# Patient Record
Sex: FEMALE | Race: WHITE | NOT HISPANIC OR LATINO | URBAN - METROPOLITAN AREA
[De-identification: names, ages, dates, MRNs, and addresses within clinical notes are randomized per-mention and may not be internally consistent; named-entity substitution may affect disease eponyms.]

---

## 2021-02-09 ENCOUNTER — EMERGENCY (EMERGENCY)
Facility: HOSPITAL | Age: 71
LOS: 1 days | Discharge: HOME | End: 2021-02-09
Attending: EMERGENCY MEDICINE
Payer: MEDICARE

## 2021-02-09 VITALS
SYSTOLIC BLOOD PRESSURE: 94 MMHG | TEMPERATURE: 98 F | OXYGEN SATURATION: 98 % | DIASTOLIC BLOOD PRESSURE: 67 MMHG | RESPIRATION RATE: 16 BRPM | HEART RATE: 83 BPM

## 2021-02-09 LAB
ALBUMIN SERPL ELPH-MCNC: 2.8 G/DL — LOW (ref 3.5–5)
ALP SERPL-CCNC: 95 U/L — SIGNIFICANT CHANGE UP (ref 40–120)
ALT FLD-CCNC: 18 U/L DA — SIGNIFICANT CHANGE UP (ref 10–60)
ANION GAP SERPL CALC-SCNC: 5 MMOL/L — SIGNIFICANT CHANGE UP (ref 5–17)
APPEARANCE UR: CLEAR — SIGNIFICANT CHANGE UP
AST SERPL-CCNC: 12 U/L — SIGNIFICANT CHANGE UP (ref 10–40)
BASOPHILS # BLD AUTO: 0.04 K/UL — SIGNIFICANT CHANGE UP (ref 0–0.2)
BASOPHILS NFR BLD AUTO: 0.9 % — SIGNIFICANT CHANGE UP (ref 0–2)
BILIRUB SERPL-MCNC: 0.3 MG/DL — SIGNIFICANT CHANGE UP (ref 0.2–1.2)
BILIRUB UR-MCNC: NEGATIVE — SIGNIFICANT CHANGE UP
BUN SERPL-MCNC: 19 MG/DL — HIGH (ref 7–18)
CALCIUM SERPL-MCNC: 8.7 MG/DL — SIGNIFICANT CHANGE UP (ref 8.4–10.5)
CHLORIDE SERPL-SCNC: 109 MMOL/L — HIGH (ref 96–108)
CO2 SERPL-SCNC: 28 MMOL/L — SIGNIFICANT CHANGE UP (ref 22–31)
COLOR SPEC: YELLOW — SIGNIFICANT CHANGE UP
CREAT SERPL-MCNC: 0.88 MG/DL — SIGNIFICANT CHANGE UP (ref 0.5–1.3)
DIFF PNL FLD: NEGATIVE — SIGNIFICANT CHANGE UP
EOSINOPHIL # BLD AUTO: 0.25 K/UL — SIGNIFICANT CHANGE UP (ref 0–0.5)
EOSINOPHIL NFR BLD AUTO: 5.4 % — SIGNIFICANT CHANGE UP (ref 0–6)
GLUCOSE SERPL-MCNC: 83 MG/DL — SIGNIFICANT CHANGE UP (ref 70–99)
GLUCOSE UR QL: NEGATIVE — SIGNIFICANT CHANGE UP
HCT VFR BLD CALC: 36.9 % — SIGNIFICANT CHANGE UP (ref 34.5–45)
HGB BLD-MCNC: 11.7 G/DL — SIGNIFICANT CHANGE UP (ref 11.5–15.5)
IMM GRANULOCYTES NFR BLD AUTO: 0.2 % — SIGNIFICANT CHANGE UP (ref 0–1.5)
KETONES UR-MCNC: NEGATIVE — SIGNIFICANT CHANGE UP
LEUKOCYTE ESTERASE UR-ACNC: NEGATIVE — SIGNIFICANT CHANGE UP
LYMPHOCYTES # BLD AUTO: 1.85 K/UL — SIGNIFICANT CHANGE UP (ref 1–3.3)
LYMPHOCYTES # BLD AUTO: 40.3 % — SIGNIFICANT CHANGE UP (ref 13–44)
MCHC RBC-ENTMCNC: 29.4 PG — SIGNIFICANT CHANGE UP (ref 27–34)
MCHC RBC-ENTMCNC: 31.7 GM/DL — LOW (ref 32–36)
MCV RBC AUTO: 92.7 FL — SIGNIFICANT CHANGE UP (ref 80–100)
MONOCYTES # BLD AUTO: 0.69 K/UL — SIGNIFICANT CHANGE UP (ref 0–0.9)
MONOCYTES NFR BLD AUTO: 15 % — HIGH (ref 2–14)
NEUTROPHILS # BLD AUTO: 1.75 K/UL — LOW (ref 1.8–7.4)
NEUTROPHILS NFR BLD AUTO: 38.2 % — LOW (ref 43–77)
NITRITE UR-MCNC: NEGATIVE — SIGNIFICANT CHANGE UP
NRBC # BLD: 0 /100 WBCS — SIGNIFICANT CHANGE UP (ref 0–0)
PH UR: 6 — SIGNIFICANT CHANGE UP (ref 5–8)
PLATELET # BLD AUTO: 210 K/UL — SIGNIFICANT CHANGE UP (ref 150–400)
POTASSIUM SERPL-MCNC: 4.2 MMOL/L — SIGNIFICANT CHANGE UP (ref 3.5–5.3)
POTASSIUM SERPL-SCNC: 4.2 MMOL/L — SIGNIFICANT CHANGE UP (ref 3.5–5.3)
PROT SERPL-MCNC: 6.3 G/DL — SIGNIFICANT CHANGE UP (ref 6–8.3)
PROT UR-MCNC: NEGATIVE — SIGNIFICANT CHANGE UP
RBC # BLD: 3.98 M/UL — SIGNIFICANT CHANGE UP (ref 3.8–5.2)
RBC # FLD: 14.4 % — SIGNIFICANT CHANGE UP (ref 10.3–14.5)
SODIUM SERPL-SCNC: 142 MMOL/L — SIGNIFICANT CHANGE UP (ref 135–145)
SP GR SPEC: 1.02 — SIGNIFICANT CHANGE UP (ref 1.01–1.02)
TROPONIN I SERPL-MCNC: <0.015 NG/ML — SIGNIFICANT CHANGE UP (ref 0–0.04)
TSH SERPL-MCNC: 1.77 UU/ML — SIGNIFICANT CHANGE UP (ref 0.34–4.82)
UROBILINOGEN FLD QL: NEGATIVE — SIGNIFICANT CHANGE UP
WBC # BLD: 4.59 K/UL — SIGNIFICANT CHANGE UP (ref 3.8–10.5)
WBC # FLD AUTO: 4.59 K/UL — SIGNIFICANT CHANGE UP (ref 3.8–10.5)

## 2021-02-09 PROCEDURE — 99285 EMERGENCY DEPT VISIT HI MDM: CPT

## 2021-02-09 PROCEDURE — 71045 X-RAY EXAM CHEST 1 VIEW: CPT | Mod: 26

## 2021-02-09 PROCEDURE — 70450 CT HEAD/BRAIN W/O DYE: CPT | Mod: 26

## 2021-02-09 RX ORDER — METOCLOPRAMIDE HCL 10 MG
10 TABLET ORAL ONCE
Refills: 0 | Status: COMPLETED | OUTPATIENT
Start: 2021-02-09 | End: 2021-02-09

## 2021-02-09 RX ORDER — SODIUM CHLORIDE 9 MG/ML
1000 INJECTION INTRAMUSCULAR; INTRAVENOUS; SUBCUTANEOUS ONCE
Refills: 0 | Status: COMPLETED | OUTPATIENT
Start: 2021-02-09 | End: 2021-02-09

## 2021-02-09 RX ADMIN — Medication 10 MILLIGRAM(S): at 22:24

## 2021-02-09 RX ADMIN — SODIUM CHLORIDE 1000 MILLILITER(S): 9 INJECTION INTRAMUSCULAR; INTRAVENOUS; SUBCUTANEOUS at 22:22

## 2021-02-09 NOTE — ED ADULT NURSE NOTE - NSIMPLEMENTINTERV_GEN_ALL_ED
Implemented All Fall Risk Interventions:  [place] to call system. Call bell, personal items and telephone within reach. Instruct patient to call for assistance. Room bathroom lighting operational. Non-slip footwear when patient is off stretcher. Physically safe environment: no spills, clutter or unnecessary equipment. Stretcher in lowest position, wheels locked, appropriate side rails in place. Provide visual cue, wrist band, yellow gown, etc. Monitor gait and stability. Monitor for mental status changes and reorient to person, place, and time. Review medications for side effects contributing to fall risk. Reinforce activity limits and safety measures with patient and family.

## 2021-02-09 NOTE — ED ADULT NURSE NOTE - OBJECTIVE STATEMENT
Patient was sent from assisted living home for evaluation of period of lethargy and confusion.  Patient is alert, oriented to person and place,

## 2021-02-09 NOTE — ED PROVIDER NOTE - OBJECTIVE STATEMENT
69 y/o female h/o bipolar disorder, hypercholesterolemia, was referred here because she was found in her room not opening her eyes but stating her name when asked what her name is. Pt currently c/o diffuse headache. Pt states she is always tired and sleeping. Denies fever, N/V/D, cough, photophobia, or nuchal rigidity. Medications include: Trazadone, Clonazepam, Gabapentin, Bupropion, and Lamictal.

## 2021-02-09 NOTE — ED PROVIDER NOTE - PATIENT PORTAL LINK FT
You can access the FollowMyHealth Patient Portal offered by [facility] by registering at the following website: [email or web address]. By joining [employer]’s FollowMyHealth portal, you will also be able to view your health information using other applications (apps) compatible with our system.

## 2021-02-09 NOTE — ED ADULT TRIAGE NOTE - CHIEF COMPLAINT QUOTE
Patient was sent from assisted living home for evaluation of period of lethargy and confusion.  Patient is alert, oriented to person and place, disoriented to time

## 2021-02-10 VITALS
HEART RATE: 71 BPM | OXYGEN SATURATION: 99 % | DIASTOLIC BLOOD PRESSURE: 68 MMHG | SYSTOLIC BLOOD PRESSURE: 115 MMHG | RESPIRATION RATE: 17 BRPM

## 2021-02-10 LAB
RAPID RVP RESULT: SIGNIFICANT CHANGE UP
SARS-COV-2 RNA SPEC QL NAA+PROBE: SIGNIFICANT CHANGE UP

## 2021-02-10 PROCEDURE — 36415 COLL VENOUS BLD VENIPUNCTURE: CPT

## 2021-02-10 PROCEDURE — 85025 COMPLETE CBC W/AUTO DIFF WBC: CPT

## 2021-02-10 PROCEDURE — 71045 X-RAY EXAM CHEST 1 VIEW: CPT

## 2021-02-10 PROCEDURE — 93005 ELECTROCARDIOGRAM TRACING: CPT

## 2021-02-10 PROCEDURE — 70450 CT HEAD/BRAIN W/O DYE: CPT

## 2021-02-10 PROCEDURE — 96374 THER/PROPH/DIAG INJ IV PUSH: CPT

## 2021-02-10 PROCEDURE — 99284 EMERGENCY DEPT VISIT MOD MDM: CPT | Mod: 25

## 2021-02-10 PROCEDURE — 84484 ASSAY OF TROPONIN QUANT: CPT

## 2021-02-10 PROCEDURE — 81003 URINALYSIS AUTO W/O SCOPE: CPT

## 2021-02-10 PROCEDURE — 84443 ASSAY THYROID STIM HORMONE: CPT

## 2021-02-10 PROCEDURE — 0225U NFCT DS DNA&RNA 21 SARSCOV2: CPT

## 2021-02-10 PROCEDURE — 80053 COMPREHEN METABOLIC PANEL: CPT

## 2022-01-25 NOTE — ED PROVIDER NOTE - NS ED MD DISPO DISCHARGE
Patient has a history of chronically elevated troponin level in 500s and today patient's troponin level was 596.  There was no ST-T abnormalities accompanying elevated troponin level.  No recent ischemia evaluation on record. Will consult Cardiology in a.m. for recommendations     Home

## 2022-02-05 ENCOUNTER — EMERGENCY (EMERGENCY)
Facility: HOSPITAL | Age: 72
LOS: 1 days | Discharge: HOME | End: 2022-02-05
Attending: EMERGENCY MEDICINE
Payer: MEDICARE

## 2022-02-05 VITALS
WEIGHT: 154.98 LBS | RESPIRATION RATE: 16 BRPM | HEART RATE: 82 BPM | OXYGEN SATURATION: 96 % | DIASTOLIC BLOOD PRESSURE: 74 MMHG | SYSTOLIC BLOOD PRESSURE: 111 MMHG | TEMPERATURE: 98 F | HEIGHT: 62 IN

## 2022-02-05 PROCEDURE — 99285 EMERGENCY DEPT VISIT HI MDM: CPT

## 2022-02-05 PROCEDURE — 70486 CT MAXILLOFACIAL W/O DYE: CPT | Mod: 26,MA

## 2022-02-05 PROCEDURE — 70450 CT HEAD/BRAIN W/O DYE: CPT | Mod: 26,MA

## 2022-02-05 NOTE — ED ADULT TRIAGE NOTE - CHIEF COMPLAINT QUOTE
BIBA from assisted living, pt states she tripped and fell, landed on her face, broke her nose and dentures, denies LOC, dried blood noted in nose and mouth, not actively bleeding, ems states pt was ambulating steadily on scene

## 2022-02-05 NOTE — ED PROVIDER NOTE - PROGRESS NOTE DETAILS
pt's sister Génesis Morocho [phone] claims she have h/o ataxia, tremors in the past, pt was supposed to get neurology w/u but never happened Pt insists to call her sister regarding her broken denture/broken implants.  Message left with pt's sister to come up to [place] from [place] to replace her teeth. Case d/w Dr. Albrecht, will arrange for neuro consult once pt returns to [place]. case d/w pt's sister, Génesis Morocho

## 2022-02-05 NOTE — ED PROVIDER NOTE - OBJECTIVE STATEMENT
71 y.o. [place] female BIBA claims she accidentally tripped on the rug, fell forward & hit her face.  Pt broke her upper denture, lower implant.  Pt also sustained Lt sided epistaxis, no head injury, LOC, other injuries, neck pain.

## 2022-02-05 NOTE — ED PROVIDER NOTE - PATIENT PORTAL LINK FT
You can access the FollowMyHealth Patient Portal offered by [facility] by registering at the following website: [email or web address]. By joining [employer]’s FollowMyHealth portal, you will also be able to view your health information using other applications (apps) compatible with our system.

## 2022-02-05 NOTE — ED PROVIDER NOTE - PHYSICAL EXAMINATION
mid upper lip-inner lip-small lac/hematoma  no teeth upper lip  lower teeth-few implant post noted  nose- no active bleeding, NT to palp., mild deformity Lt nasal septum  neck- NT to palp

## 2022-02-06 VITALS
HEART RATE: 73 BPM | DIASTOLIC BLOOD PRESSURE: 70 MMHG | RESPIRATION RATE: 18 BRPM | OXYGEN SATURATION: 97 % | SYSTOLIC BLOOD PRESSURE: 122 MMHG | TEMPERATURE: 98 F

## 2022-02-06 PROBLEM — F31.9 BIPOLAR DISORDER, UNSPECIFIED: Chronic | Status: ACTIVE | Noted: 2021-02-10

## 2022-02-06 PROBLEM — E78.00 PURE HYPERCHOLESTEROLEMIA, UNSPECIFIED: Chronic | Status: ACTIVE | Noted: 2021-02-10

## 2022-02-06 PROCEDURE — 70450 CT HEAD/BRAIN W/O DYE: CPT | Mod: MA

## 2022-02-06 PROCEDURE — 70486 CT MAXILLOFACIAL W/O DYE: CPT | Mod: MA

## 2022-02-06 PROCEDURE — 99284 EMERGENCY DEPT VISIT MOD MDM: CPT | Mod: 25

## 2022-02-06 RX ORDER — TRAZODONE HCL 50 MG
100 TABLET ORAL ONCE
Refills: 0 | Status: COMPLETED | OUTPATIENT
Start: 2022-02-06 | End: 2022-02-06

## 2022-02-06 RX ORDER — CLONAZEPAM 1 MG
0.5 TABLET ORAL ONCE
Refills: 0 | Status: DISCONTINUED | OUTPATIENT
Start: 2022-02-06 | End: 2022-02-06

## 2022-02-06 RX ADMIN — Medication 100 MILLIGRAM(S): at 01:08

## 2022-02-06 RX ADMIN — Medication 0.5 MILLIGRAM(S): at 01:08

## 2022-02-06 NOTE — ED ADULT NURSE NOTE - OBJECTIVE STATEMENT
BIBA from assisted living s/p fall. Pt tripped and fell, landed on face, broke nose and dentures. No active bleeding noted. Dried blood noted in nose and mouth

## 2024-01-17 NOTE — ED PROVIDER NOTE - PROGRESS NOTE DETAILS
Pt informed through portal message.   Pt s/o pending CT results and plan for d/c. Pt awake alert and ambulatory w/o difficulty. Asking for d/c back to [facility]. [facility] service contacted.

## 2025-07-01 ENCOUNTER — EMERGENCY (EMERGENCY)
Facility: HOSPITAL | Age: 75
LOS: 1 days | End: 2025-07-01
Attending: EMERGENCY MEDICINE | Admitting: EMERGENCY MEDICINE
Payer: MEDICARE

## 2025-07-01 VITALS
WEIGHT: 113.1 LBS | SYSTOLIC BLOOD PRESSURE: 109 MMHG | HEIGHT: 62 IN | TEMPERATURE: 98 F | DIASTOLIC BLOOD PRESSURE: 69 MMHG | HEART RATE: 83 BPM | RESPIRATION RATE: 22 BRPM | OXYGEN SATURATION: 96 %

## 2025-07-01 VITALS
HEART RATE: 87 BPM | SYSTOLIC BLOOD PRESSURE: 126 MMHG | DIASTOLIC BLOOD PRESSURE: 74 MMHG | OXYGEN SATURATION: 99 % | RESPIRATION RATE: 18 BRPM | TEMPERATURE: 98 F

## 2025-07-01 LAB
ALBUMIN SERPL ELPH-MCNC: 3.9 G/DL — SIGNIFICANT CHANGE UP (ref 3.3–5)
ALP SERPL-CCNC: 90 U/L — SIGNIFICANT CHANGE UP (ref 40–120)
ALT FLD-CCNC: 27 U/L — SIGNIFICANT CHANGE UP (ref 4–33)
AMPHET UR-MCNC: NEGATIVE — SIGNIFICANT CHANGE UP
ANION GAP SERPL CALC-SCNC: 15 MMOL/L — HIGH (ref 7–14)
APAP SERPL-MCNC: <10 UG/ML — LOW (ref 15–25)
APPEARANCE UR: CLEAR — SIGNIFICANT CHANGE UP
AST SERPL-CCNC: 31 U/L — SIGNIFICANT CHANGE UP (ref 4–32)
BARBITURATES UR SCN-MCNC: NEGATIVE — SIGNIFICANT CHANGE UP
BASOPHILS # BLD AUTO: 0.03 K/UL — SIGNIFICANT CHANGE UP (ref 0–0.2)
BASOPHILS NFR BLD AUTO: 0.6 % — SIGNIFICANT CHANGE UP (ref 0–2)
BENZODIAZ UR-MCNC: NEGATIVE — SIGNIFICANT CHANGE UP
BILIRUB SERPL-MCNC: 0.5 MG/DL — SIGNIFICANT CHANGE UP (ref 0.2–1.2)
BILIRUB UR-MCNC: NEGATIVE — SIGNIFICANT CHANGE UP
BUN SERPL-MCNC: 14 MG/DL — SIGNIFICANT CHANGE UP (ref 7–23)
CALCIUM SERPL-MCNC: 9.1 MG/DL — SIGNIFICANT CHANGE UP (ref 8.4–10.5)
CHLORIDE SERPL-SCNC: 98 MMOL/L — SIGNIFICANT CHANGE UP (ref 98–107)
CO2 SERPL-SCNC: 22 MMOL/L — SIGNIFICANT CHANGE UP (ref 22–31)
COCAINE METAB.OTHER UR-MCNC: NEGATIVE — SIGNIFICANT CHANGE UP
COLOR SPEC: YELLOW — SIGNIFICANT CHANGE UP
CREAT SERPL-MCNC: 0.79 MG/DL — SIGNIFICANT CHANGE UP (ref 0.5–1.3)
CREATININE URINE RESULT, DAU: 12 MG/DL — SIGNIFICANT CHANGE UP
DIFF PNL FLD: NEGATIVE — SIGNIFICANT CHANGE UP
EGFR: 77 ML/MIN/1.73M2 — SIGNIFICANT CHANGE UP
EGFR: 77 ML/MIN/1.73M2 — SIGNIFICANT CHANGE UP
EOSINOPHIL # BLD AUTO: 0.1 K/UL — SIGNIFICANT CHANGE UP (ref 0–0.5)
EOSINOPHIL NFR BLD AUTO: 1.8 % — SIGNIFICANT CHANGE UP (ref 0–6)
ETHANOL SERPL-MCNC: <10 MG/DL — SIGNIFICANT CHANGE UP
FENTANYL UR QL SCN: NEGATIVE — SIGNIFICANT CHANGE UP
GLUCOSE SERPL-MCNC: 95 MG/DL — SIGNIFICANT CHANGE UP (ref 70–99)
GLUCOSE UR QL: NEGATIVE MG/DL — SIGNIFICANT CHANGE UP
HCT VFR BLD CALC: 35 % — SIGNIFICANT CHANGE UP (ref 34.5–45)
HGB BLD-MCNC: 11.5 G/DL — SIGNIFICANT CHANGE UP (ref 11.5–15.5)
IMM GRANULOCYTES # BLD AUTO: 0.01 K/UL — SIGNIFICANT CHANGE UP (ref 0–0.07)
IMM GRANULOCYTES NFR BLD AUTO: 0.2 % — SIGNIFICANT CHANGE UP (ref 0–0.9)
KETONES UR QL: NEGATIVE MG/DL — SIGNIFICANT CHANGE UP
LEUKOCYTE ESTERASE UR-ACNC: NEGATIVE — SIGNIFICANT CHANGE UP
LYMPHOCYTES # BLD AUTO: 1.42 K/UL — SIGNIFICANT CHANGE UP (ref 1–3.3)
LYMPHOCYTES NFR BLD AUTO: 26.1 % — SIGNIFICANT CHANGE UP (ref 13–44)
MCHC RBC-ENTMCNC: 30.3 PG — SIGNIFICANT CHANGE UP (ref 27–34)
MCHC RBC-ENTMCNC: 32.9 G/DL — SIGNIFICANT CHANGE UP (ref 32–36)
MCV RBC AUTO: 92.1 FL — SIGNIFICANT CHANGE UP (ref 80–100)
METHADONE UR-MCNC: NEGATIVE — SIGNIFICANT CHANGE UP
MONOCYTES # BLD AUTO: 0.52 K/UL — SIGNIFICANT CHANGE UP (ref 0–0.9)
MONOCYTES NFR BLD AUTO: 9.6 % — SIGNIFICANT CHANGE UP (ref 2–14)
NEUTROPHILS # BLD AUTO: 3.36 K/UL — SIGNIFICANT CHANGE UP (ref 1.8–7.4)
NEUTROPHILS NFR BLD AUTO: 61.7 % — SIGNIFICANT CHANGE UP (ref 43–77)
NITRITE UR-MCNC: NEGATIVE — SIGNIFICANT CHANGE UP
NRBC # BLD AUTO: 0 K/UL — SIGNIFICANT CHANGE UP (ref 0–0)
NRBC # FLD: 0 K/UL — SIGNIFICANT CHANGE UP (ref 0–0)
NRBC BLD AUTO-RTO: 0 /100 WBCS — SIGNIFICANT CHANGE UP (ref 0–0)
NT-PROBNP SERPL-SCNC: 201 PG/ML — SIGNIFICANT CHANGE UP
OPIATES UR-MCNC: NEGATIVE — SIGNIFICANT CHANGE UP
OXYCODONE UR-MCNC: NEGATIVE — SIGNIFICANT CHANGE UP
PCP SPEC-MCNC: SIGNIFICANT CHANGE UP
PCP UR-MCNC: NEGATIVE — SIGNIFICANT CHANGE UP
PH UR: 8 — SIGNIFICANT CHANGE UP (ref 5–8)
PLATELET # BLD AUTO: 207 K/UL — SIGNIFICANT CHANGE UP (ref 150–400)
PMV BLD: 9.7 FL — SIGNIFICANT CHANGE UP (ref 7–13)
POTASSIUM SERPL-MCNC: 3.8 MMOL/L — SIGNIFICANT CHANGE UP (ref 3.5–5.3)
POTASSIUM SERPL-SCNC: 3.8 MMOL/L — SIGNIFICANT CHANGE UP (ref 3.5–5.3)
PROT SERPL-MCNC: 6.9 G/DL — SIGNIFICANT CHANGE UP (ref 6–8.3)
PROT UR-MCNC: NEGATIVE MG/DL — SIGNIFICANT CHANGE UP
RBC # BLD: 3.8 M/UL — SIGNIFICANT CHANGE UP (ref 3.8–5.2)
RBC # FLD: 13.7 % — SIGNIFICANT CHANGE UP (ref 10.3–14.5)
SALICYLATES SERPL-MCNC: <0.3 MG/DL — LOW (ref 15–30)
SODIUM SERPL-SCNC: 135 MMOL/L — SIGNIFICANT CHANGE UP (ref 135–145)
SP GR SPEC: 1 — LOW (ref 1–1.03)
THC UR QL: NEGATIVE — SIGNIFICANT CHANGE UP
TOXICOLOGY SCREEN, DRUGS OF ABUSE, SERUM RESULT: SIGNIFICANT CHANGE UP
TROPONIN T, HIGH SENSITIVITY RESULT: 9 NG/L — SIGNIFICANT CHANGE UP
TSH SERPL-MCNC: 2.11 UIU/ML — SIGNIFICANT CHANGE UP (ref 0.27–4.2)
UROBILINOGEN FLD QL: 0.2 MG/DL — SIGNIFICANT CHANGE UP (ref 0.2–1)
WBC # BLD: 5.44 K/UL — SIGNIFICANT CHANGE UP (ref 3.8–10.5)
WBC # FLD AUTO: 5.44 K/UL — SIGNIFICANT CHANGE UP (ref 3.8–10.5)

## 2025-07-01 PROCEDURE — 99284 EMERGENCY DEPT VISIT MOD MDM: CPT

## 2025-07-01 PROCEDURE — 93971 EXTREMITY STUDY: CPT | Mod: 26,LT

## 2025-07-01 PROCEDURE — 71046 X-RAY EXAM CHEST 2 VIEWS: CPT | Mod: 26

## 2025-07-01 PROCEDURE — 93010 ELECTROCARDIOGRAM REPORT: CPT

## 2025-07-01 RX ORDER — BUPROPION HYDROBROMIDE 522 MG/1
150 TABLET, EXTENDED RELEASE ORAL ONCE
Refills: 0 | Status: COMPLETED | OUTPATIENT
Start: 2025-07-01 | End: 2025-07-01

## 2025-07-01 RX ORDER — LORAZEPAM 4 MG/ML
0.5 VIAL (ML) INJECTION ONCE
Refills: 0 | Status: DISCONTINUED | OUTPATIENT
Start: 2025-07-01 | End: 2025-07-01

## 2025-07-01 RX ORDER — BUPROPION HYDROBROMIDE 522 MG/1
150 TABLET, EXTENDED RELEASE ORAL ONCE
Refills: 0 | Status: DISCONTINUED | OUTPATIENT
Start: 2025-07-01 | End: 2025-07-01

## 2025-07-01 RX ADMIN — BUPROPION HYDROBROMIDE 150 MILLIGRAM(S): 522 TABLET, EXTENDED RELEASE ORAL at 18:29

## 2025-07-01 RX ADMIN — Medication 0.5 MILLIGRAM(S): at 18:30

## 2025-07-01 NOTE — ED PROVIDER NOTE - NSFOLLOWUPINSTRUCTIONS_ED_ALL_ED_FT
Follow-up with your primary care doctor within 1 week  Continue taking medications as previously prescribed  Keep legs elevated while resting  Return to the ER with any worsening or concerning symptoms, worsening leg swelling, shortness of breath, chest pain, dizziness, weakness or any other concerns You were seen and evaluated in the emergency department for lightheadedness.  Your lab work and imaging was normal.  The results are included in this packet.  Follow-up with your primary care doctor within 1 week  Continue taking medications as previously prescribed  Keep legs elevated while resting  Return to the ER with any worsening or concerning symptoms, worsening leg swelling, shortness of breath, chest pain, dizziness, weakness or any other concerns

## 2025-07-01 NOTE — ED ADULT NURSE NOTE - OBJECTIVE STATEMENT
pt received to 26, aox4.  pt sent to ED for "delusions"  pt's meds were recently adjusted and as per EMS, pt was haing delusions at the nursing home.  pt states she just feels tired because she was unable to sleep last night.  pt has no other complaints, denies SI/HI/AVH.  SL placed, labs sent, awaiting further rders

## 2025-07-01 NOTE — ED PROVIDER NOTE - PATIENT PORTAL LINK FT
You can access the FollowMyHealth Patient Portal offered by [facility] by registering at the following website: [email or web address]. By joining [employer]’s FollowMyHealth portal, you will also be able to view your health information using other applications (apps) compatible with our system. You can access the FollowMyHealth Patient Portal offered by [facility] by registering at the following website: [email or web address]. By joining [employer]’s FollowMyHealth portal, you will also be able to view your health information using other applications (apps) compatible with our system. You can access the FollowMyHealth Patient Portal offered by [facility] by registering at the following website: [email or web address]. By joining [employer]’s FollowMyHealth portal, you will also be able to view your health information using other applications (apps) compatible with our system.

## 2025-07-01 NOTE — ED PROVIDER NOTE - CLINICAL SUMMARY MEDICAL DECISION MAKING FREE TEXT BOX
74yF w/pmhx bipolar, HLD, COPD sent to the ED from [facility] with concern for agitation, right leg swelling. Pt states this morning she was not feeling well, reports feeling lightheaded and as though she might pass out. States she had breakfast and was instructed to take her medications but did not fell well, was seen by the nurse and sent to the ED for further evaluation. Pt reports that she had recently had a fall, was using a walker but is now using a cane to ambulate. Pt also reports at times she has shortness of breath but attributes this to her lung disease. Pt states her clonazepam dose was recently changed, she hasn't been sleeping well and reports she also drank coffee which she is not supposed to. Pt denies fever/chills, chest pain, palpitations, headache, abd pain, n/v/d or any other concerns. 74yF w/pmhx bipolar, HLD, COPD sent to the ED from [facility] with concern for agitation, right leg swelling. Pt states this morning she was not feeling well, reports feeling lightheaded and as though she might pass out. States she had breakfast and was instructed to take her medications but did not fell well, was seen by the nurse and sent to the ED for further evaluation. Pt reports that she had recently had a fall, was using a walker but is now using a cane to ambulate. Pt also reports at times she has shortness of breath but attributes this to her lung disease. Spoke with RN at [facility] assisted living, on Saturday clonazepam was changed from BID to daily and Trazadone was discontinued. On exam pt is well appearing, afebrile, pt is well appearing, A&Ox3, answering questions appropriately, calm and cooperative, b/l LE edema R>L. Will r/o ACS, new onset CHF, DVT, no tachycardia or hypoxia to suggest PE. Plan: cbc/cmp, trop, bnp, US duplex RLE, CXR, EKG, cardiac monitor. Pt denies SI/HI, auditory or visual hallucinations, not anxious at time of my evaluation, no indication for emergent psych consult in ED. 74yF w/pmhx bipolar, HLD, COPD sent to the ED from [facility] with concern for agitation, right leg swelling. Pt states this morning she was not feeling well, reports feeling lightheaded and as though she might pass out. States she had breakfast and was instructed to take her medications but did not fell well, was seen by the nurse and sent to the ED for further evaluation. Pt reports that she had recently had a fall, was using a walker but is now using a cane to ambulate. Pt also reports at times she has shortness of breath but attributes this to her lung disease, unchanged from her baseline, not worsening. Spoke with RN at [facility] assisted living, on Saturday clonazepam was changed from BID to daily and Trazadone was discontinued. On exam pt is well appearing, afebrile, pt is well appearing, A&Ox3, answering questions appropriately, calm and cooperative, b/l LE edema R>L. Will r/o ACS, new onset CHF, DVT, no tachycardia or hypoxia to suggest PE. Plan: cbc/cmp, trop, bnp, US duplex RLE, CXR, EKG, cardiac monitor. Pt denies SI/HI, auditory or visual hallucinations, not anxious at time of my evaluation, no indication for emergent psych consult in ED.

## 2025-07-01 NOTE — PROVIDER CONTACT NOTE (OTHER) - ASSESSMENT
Pt [facility] [address] [phone]. Trip# [id number]. P/U 1 AM.
Arranged [facility] [phone]. Trip# [id number]. P/U 7 PM. Pt is returning back to [facility] in [facility] at [address]

## 2025-07-01 NOTE — ED PROVIDER NOTE - DATE/TIME 1
01-Jul-2025 12:01
X Size Of Lesion In Cm (Optional): 0
Introduction Text (Please End With A Colon): The following procedure was deferred:
Other Procedure: cosmetic extraction
Detail Level: Simple

## 2025-07-01 NOTE — ED PROVIDER NOTE - NEURO NEGATIVE STATEMENT, MLM
----- Message from Matilde Villar sent at 7/18/2023  1:20 PM CDT -----  Patient came by office to make appt with Dr Wilson    please call patient on this matter and she will see who ever is coming   patient having issues with left foot pain    and toe nail clip                  reach at [phone]     no loss of consciousness, no gait abnormality, no headache, no sensory deficits, and no weakness.

## 2025-07-01 NOTE — ED ADULT TRIAGE NOTE - CHIEF COMPLAINT QUOTE
Patient sent from [facility] for psychiatric evaluation. Per EMS patient's trazodone and klonopin were recently discontinued and per staff she was having delusions this morning. Patient alert and oriented in triage but does not know why she is here, reports anxiety, denies SI/HI/hallucinations. Patient c/o bilateral lower extremity edema, pitting edema noted, R worse than L, denies chest pain, SOB, dizziness at this time. Phx bipolar, HTN, COPD

## 2025-07-01 NOTE — ED PROVIDER NOTE - PROGRESS NOTE DETAILS
TERELL Meredith: Spoke with RN Deirdre Avilez, states she was complaining of feeling lightheaded, noted to have right leg swollen, clonazepam went from BID to once daily Saturday, has been agitated since then, "outbursts"/ shouting at staff, kept saying she is not feeling well. Trazadone stopped on Saturday. Sent to ED for evaluation. TERELL Meredith: Spoke with TU Spaulding at [facility], discussed all results, no indication for psychiatry consult in ED, she states pt has a psychiatrist they can speak with for further medication management. Pt is well appearing, family at bedside at time of discharge, Pt will return to the ER with any worsening or concerning symptoms. TERELL Meredith: Spoke with TU Spaulding at [facility], discussed all results, no indication for psychiatry consult in ED, she states pt has a psychiatrist they can speak with for further medication management. Pt is well appearing, family at bedside at time of discharge, Pt will return to the ER with any worsening or concerning symptoms. Discharge discussed with ED attending, plan to rpt trop prior to discharge. Spoke with patients sister Merari Rossi, reports pt is at her baseline mental status. TERELL Meredith: Rpt trop 9 (unchanged), ua without evidence of infection. Pt discharged back to [facility]. SW assisted with transportation back to [facility], discharge discussed with ed attending.  Spoke with TU Gilmore, informed we gave 5pm dose of Wellbutrin XL 150mg (per documentation sent with pt she takes this medication at 9am and 5pm). TERELL Meredith: Rpt trop 9 (unchanged), ua without evidence of infection. Pt discharged back to [facility]. SW assisted with transportation back to [facility], discharge discussed with ed attending.  Spoke with TU Gilomre, informed we gave 5pm dose of Wellbutrin XL 150mg ER (per documentation sent with pt she takes this medication at 9am and 5pm). TERELL Meredith: Transportation arrived for patient, she states her symptoms have returned, reports feeling swelling to her gums, "sensation" across her forehead. Concern for likely anxiety, will give 0.5mg ativan and reassess. TERELL Meredith: Attempted to discuss with RN at [facility], per  at [facility] there is no longer a nurse present. to speak with. TERELL Meredith: Spoke with TU Spaulding at [facility], discussed all results, no indication for psychiatry consult in ED, she states pt has a psychiatrist they can speak with for further medication management. Pt is well appearing, reports she is feeling well at present. Pt will return to the ER with any worsening or concerning symptoms. Discharge discussed with ED attending, plan to rpt trop prior to discharge. Spoke with patients sister Merari Rossi, reports pt is at her baseline mental status, was in ED earlier today to see patient. TERELL Meredith: Pt signed out awaiting reassessment TERELL Meredith: Transportation arrived for patient, she states her symptoms have returned, reports feeling swelling to her gums, "sensation" across her forehead, states she does not feel well but is unable to further detail, denies cp, sob, abd pain, nausea. Concern for likely possible anxiety will give 0.5mg ativan and reassess. Dayna Shin PGY-1:  received patient at sign out. reassessed and patient feeling better. discussed labs and imaging with pts relative Génesis. TERELL Meredith: Transportation arrived for patient, she states her symptoms have returned that she initially felt this morning at [facility], reports feeling swelling to her gums (on exam there is no gum swelling), "sensation" across her forehead (unable to further characterize), states she does not feel well but is unable to further detail, denies cp, sob, palpitations, abd pain, nausea. Reports feeling anxious in regards to symptoms and does not want to return to [facility] as symptoms returned. Concern for likely possible anxiety, effects of recent medication changes. Discussed with ED attending , will give 0.5mg ativan PO and reassess. TERELL Meredith: Spoke with TU Gilmore from [facility], states pt reported feeling lightheaded, noted to have right leg swollen, clonazepam changed from BID to once daily on Saturday 6/28, has been agitated since then? having "outbursts"/ shouting at staff, kept saying she is not feeling well this morning and reported feeling gum swelling/throat closing sensation? (reported by staff as delusions) Trazadone stopped on Saturday. Sent to ED for evaluation.

## 2025-07-01 NOTE — ED PROVIDER NOTE - ATTENDING APP SHARED VISIT CONTRIBUTION OF CARE
Patient seen with PA.  VSS.  Here with near syncope.  Otherwise baseline.  Non toxic.  Well appearing. No fever/chills, No photophobia/eye pain/changes in vision,  No chest pain/palpitations, no SOB/cough/wheeze/stridor, No abdominal pain, No N/V/D, no dysuria/frequency/discharge, No neck/back pain, no rash, no changes in neurological status/function.     Gen: Alert, NAD  Head: NC, AT,  normal lids/conjunctiva  ENT: normal hearing, patent oropharynx   Neck: +supple, no tenderness/meningismus/JVD, +Trachea midline  Pulm: Bilateral BS, normal resp effort, no wheeze/stridor/retractions  CV: RRR, no R/G, +dist pulses  Abd: soft, NT/ND, +BS, no hepatosplenomegaly  Mskel: no edema/erythema/cyanosis  Skin: no rash  Neuro: AAOx3, no gross sensory/motor deficits    Patient with near syncope.  VSS.  Pending repeat trop.  Patient signed out to incoming physician.  All decisions regarding the progression of care will be made at their discretion.

## 2025-07-01 NOTE — ED PROVIDER NOTE - PHYSICAL EXAMINATION
pt is well appearing, A&Ox3, answering questions appropriately, calm and cooperative  b/l LE edema R>L

## 2025-07-01 NOTE — ED PROVIDER NOTE - OBJECTIVE STATEMENT
74yF w/pmhx bipolar, HLD, COPD sent to the ED from [facility] with concern for agitation, right leg swelling. Pt states this morning she was not feeling well, reports feeling lightheaded and as though she might pass out. States she had breakfast and was instructed to take her medications but did not fell well, was seen by the nurse and sent to the ED for further evaluation. Pt reports that she had recently had a fall, was using a walker but is now using a cane to ambulate. Pt also reports at times she has shortness of breath but attributes this to her lung disease. Pt states her clonazepam dose was recently changed, she hasn't been sleeping well and reports she also drank coffee which she is not supposed to. Pt denies fever/chills, chest pain, palpitations, headache, abd pain, n/v/d or any other concerns. 74yF w/pmhx bipolar, HLD, COPD sent to the ED from [facility] with concern for agitation, right leg swelling. Pt states this morning she was not feeling well, reports feeling lightheaded and as though she might pass out. States she had breakfast and was instructed to take her medications but did not fell well, was seen by the nurse and sent to the ED for further evaluation.  Pt reports that she had recently had a fall, was using a walker but is now using a cane to ambulate. Pt also reports at times she has shortness of breath but attributes this to her lung disease. Pt states her clonazepam dose was recently changed, she hasn't been sleeping well and reports she also drank coffee which she is not supposed to. Pt states once she was outside in the open air with EMS symptoms resolved. Pt denies fever/chills, chest pain, palpitations, headache, abd pain, n/v/d or any other concerns. 74yF w/pmhx bipolar, HLD, COPD sent to the ED from [facility] with concern for agitation, right leg swelling. Pt states this morning she was not feeling well, reports feeling lightheaded and as though she might pass out (pt states "not feeling well" although unable to further characterize). States she had breakfast and was instructed to take her medications but did not fell well, was seen by the nurse and sent to the ED for further evaluation.  Pt reports that she had recently had a fall, was using a walker but is now using a cane to ambulate. Pt also reports at times she has shortness of breath but attributes this to her lung disease, is unchanged from her baseline. Pt states her clonazepam dose was recently changed, she hasn't been sleeping well and reports she also drank coffee which she is not supposed to. Pt states once she was outside in the open air with EMS symptoms resolved. Pt denies fever/chills, chest pain, palpitations, headache, abd pain, n/v/d or any other concerns.

## 2025-07-01 NOTE — ED ADULT TRIAGE NOTE - HEIGHT IN CM
DATE & TIME: 9/14/2020 9826-4196    Cognitive Concerns/ Orientation : A & O x 4   BEHAVIOR & AGGRESSION TOOL COLOR: Green  CIWA SCORE: NA   ABNL VS/O2: VSS on RA  MOBILITY: SBA  PAIN MANAGMENT: Denies  DIET: Renal  BOWEL/BLADDER: Continent  ABNL LAB/BG: Cr 9.69  DRAIN/DEVICES: PIV SL  TELEMETRY RHYTHM: NA  SKIN: Bruising, intact  TESTS/PROCEDURES: NA  D/C DAY/GOALS/PLACE: Pending a new dialysis clinic is found for the pt.  OTHER IMPORTANT INFO: Sarah Myers in room  MD/RN ROUNDING SIGNED OFF D/E SHIFT: NA  COMMIT TO SIT DONE AND SIGNED OFF Completed       157.48

## 2025-07-01 NOTE — ED ADULT NURSE NOTE - CHIEF COMPLAINT QUOTE
Patient sent from [facility] for psychiatric evaluation. Per EMS patient's trazodone and klonopin were recently discontinued and per staff she was having delusions this morning. Patient alert and oriented in triage but does not know why she is here, reports anxiety, denies SI/HI/hallucinations. Patient c/o bilateral lower extremity edema, pitting edema noted, R worse than L, denies chest pain, SOB, dizziness at this time. Phx bipolar, HTN, COPD

## 2025-07-02 ENCOUNTER — EMERGENCY (EMERGENCY)
Facility: HOSPITAL | Age: 75
LOS: 1 days | End: 2025-07-02
Attending: STUDENT IN AN ORGANIZED HEALTH CARE EDUCATION/TRAINING PROGRAM | Admitting: STUDENT IN AN ORGANIZED HEALTH CARE EDUCATION/TRAINING PROGRAM
Payer: MEDICARE

## 2025-07-02 VITALS
RESPIRATION RATE: 16 BRPM | DIASTOLIC BLOOD PRESSURE: 62 MMHG | OXYGEN SATURATION: 100 % | TEMPERATURE: 98 F | HEART RATE: 81 BPM | SYSTOLIC BLOOD PRESSURE: 107 MMHG

## 2025-07-02 VITALS
WEIGHT: 113.1 LBS | DIASTOLIC BLOOD PRESSURE: 69 MMHG | HEART RATE: 82 BPM | TEMPERATURE: 98 F | HEIGHT: 62 IN | OXYGEN SATURATION: 98 % | SYSTOLIC BLOOD PRESSURE: 105 MMHG | RESPIRATION RATE: 18 BRPM

## 2025-07-02 LAB
ALBUMIN SERPL ELPH-MCNC: 4 G/DL — SIGNIFICANT CHANGE UP (ref 3.3–5)
ALP SERPL-CCNC: 87 U/L — SIGNIFICANT CHANGE UP (ref 40–120)
ALT FLD-CCNC: 28 U/L — SIGNIFICANT CHANGE UP (ref 4–33)
ANION GAP SERPL CALC-SCNC: 14 MMOL/L — SIGNIFICANT CHANGE UP (ref 7–14)
APAP SERPL-MCNC: <10 UG/ML — LOW (ref 15–25)
AST SERPL-CCNC: 31 U/L — SIGNIFICANT CHANGE UP (ref 4–32)
BASOPHILS # BLD AUTO: 0.02 K/UL — SIGNIFICANT CHANGE UP (ref 0–0.2)
BASOPHILS NFR BLD AUTO: 0.4 % — SIGNIFICANT CHANGE UP (ref 0–2)
BILIRUB SERPL-MCNC: 0.5 MG/DL — SIGNIFICANT CHANGE UP (ref 0.2–1.2)
BUN SERPL-MCNC: 11 MG/DL — SIGNIFICANT CHANGE UP (ref 7–23)
CALCIUM SERPL-MCNC: 9.2 MG/DL — SIGNIFICANT CHANGE UP (ref 8.4–10.5)
CHLORIDE SERPL-SCNC: 102 MMOL/L — SIGNIFICANT CHANGE UP (ref 98–107)
CO2 SERPL-SCNC: 23 MMOL/L — SIGNIFICANT CHANGE UP (ref 22–31)
CREAT SERPL-MCNC: 0.74 MG/DL — SIGNIFICANT CHANGE UP (ref 0.5–1.3)
EGFR: 84 ML/MIN/1.73M2 — SIGNIFICANT CHANGE UP
EGFR: 84 ML/MIN/1.73M2 — SIGNIFICANT CHANGE UP
EOSINOPHIL # BLD AUTO: 0.04 K/UL — SIGNIFICANT CHANGE UP (ref 0–0.5)
EOSINOPHIL NFR BLD AUTO: 0.8 % — SIGNIFICANT CHANGE UP (ref 0–6)
ETHANOL SERPL-MCNC: <10 MG/DL — SIGNIFICANT CHANGE UP
GLUCOSE SERPL-MCNC: 86 MG/DL — SIGNIFICANT CHANGE UP (ref 70–99)
HCT VFR BLD CALC: 37 % — SIGNIFICANT CHANGE UP (ref 34.5–45)
HGB BLD-MCNC: 11.9 G/DL — SIGNIFICANT CHANGE UP (ref 11.5–15.5)
IMM GRANULOCYTES # BLD AUTO: 0.02 K/UL — SIGNIFICANT CHANGE UP (ref 0–0.07)
IMM GRANULOCYTES NFR BLD AUTO: 0.4 % — SIGNIFICANT CHANGE UP (ref 0–0.9)
LYMPHOCYTES # BLD AUTO: 1.53 K/UL — SIGNIFICANT CHANGE UP (ref 1–3.3)
LYMPHOCYTES NFR BLD AUTO: 31.5 % — SIGNIFICANT CHANGE UP (ref 13–44)
MCHC RBC-ENTMCNC: 30.4 PG — SIGNIFICANT CHANGE UP (ref 27–34)
MCHC RBC-ENTMCNC: 32.2 G/DL — SIGNIFICANT CHANGE UP (ref 32–36)
MCV RBC AUTO: 94.6 FL — SIGNIFICANT CHANGE UP (ref 80–100)
MONOCYTES # BLD AUTO: 0.4 K/UL — SIGNIFICANT CHANGE UP (ref 0–0.9)
MONOCYTES NFR BLD AUTO: 8.2 % — SIGNIFICANT CHANGE UP (ref 2–14)
NEUTROPHILS # BLD AUTO: 2.84 K/UL — SIGNIFICANT CHANGE UP (ref 1.8–7.4)
NEUTROPHILS NFR BLD AUTO: 58.7 % — SIGNIFICANT CHANGE UP (ref 43–77)
NRBC # BLD AUTO: 0 K/UL — SIGNIFICANT CHANGE UP (ref 0–0)
NRBC # FLD: 0 K/UL — SIGNIFICANT CHANGE UP (ref 0–0)
NRBC BLD AUTO-RTO: 0 /100 WBCS — SIGNIFICANT CHANGE UP (ref 0–0)
PLATELET # BLD AUTO: 216 K/UL — SIGNIFICANT CHANGE UP (ref 150–400)
PMV BLD: 9.8 FL — SIGNIFICANT CHANGE UP (ref 7–13)
POTASSIUM SERPL-MCNC: 3.5 MMOL/L — SIGNIFICANT CHANGE UP (ref 3.5–5.3)
POTASSIUM SERPL-SCNC: 3.5 MMOL/L — SIGNIFICANT CHANGE UP (ref 3.5–5.3)
PROT SERPL-MCNC: 7 G/DL — SIGNIFICANT CHANGE UP (ref 6–8.3)
RBC # BLD: 3.91 M/UL — SIGNIFICANT CHANGE UP (ref 3.8–5.2)
RBC # FLD: 14.1 % — SIGNIFICANT CHANGE UP (ref 10.3–14.5)
SALICYLATES SERPL-MCNC: <0.3 MG/DL — LOW (ref 15–30)
SODIUM SERPL-SCNC: 139 MMOL/L — SIGNIFICANT CHANGE UP (ref 135–145)
TSH SERPL-MCNC: 1.39 UIU/ML — SIGNIFICANT CHANGE UP (ref 0.27–4.2)
WBC # BLD: 4.85 K/UL — SIGNIFICANT CHANGE UP (ref 3.8–10.5)
WBC # FLD AUTO: 4.85 K/UL — SIGNIFICANT CHANGE UP (ref 3.8–10.5)

## 2025-07-02 PROCEDURE — 70450 CT HEAD/BRAIN W/O DYE: CPT | Mod: 26

## 2025-07-02 PROCEDURE — 93010 ELECTROCARDIOGRAM REPORT: CPT

## 2025-07-02 PROCEDURE — 99285 EMERGENCY DEPT VISIT HI MDM: CPT

## 2025-07-02 RX ORDER — ACETAMINOPHEN 500 MG/5ML
1000 LIQUID (ML) ORAL ONCE
Refills: 0 | Status: COMPLETED | OUTPATIENT
Start: 2025-07-02 | End: 2025-07-02

## 2025-07-02 RX ADMIN — Medication 400 MILLIGRAM(S): at 16:59

## 2025-07-02 NOTE — PROVIDER CONTACT NOTE (OTHER) - ASSESSMENT
Arranged [facility] [phone]. Trip# A. P/U 8:30 PM. Pt is returning back to [facility] in [facility] at [address] [place] [address].

## 2025-07-02 NOTE — ED PROVIDER NOTE - PHYSICAL EXAMINATION
On Physical Exam:  General: well appearing, in NAD, speaking clearly in full sentences and without difficulty; cooperative with exam  HEENT: PERRL, MMM  Neck: no neck tenderness, no nuchal rigidity  Cardiac: normal s1, s2; RRR; no MGR  Lungs: CTABL  Abdomen: soft nontender/nondistended  : no bladder tenderness or distension  Skin: warm, intact, no rash  Extremities: no peripheral edema, no gross deformities  Neuro: no gross neurologic deficits no SI, SA

## 2025-07-02 NOTE — ED ADULT NURSE NOTE - CHIEF COMPLAINT QUOTE
Patient coming from [facility] for psych eval. Patient recently had psych meds change and has not been acting herself. PHx- Bipolar, HLD. Patient denies S/I, H/I, A/H, V/H. Patient calm and cooperative at this time. Breathing non-labored.

## 2025-07-02 NOTE — ED ADULT TRIAGE NOTE - CHIEF COMPLAINT QUOTE
Patient coming from [facility] for psych eval. Patient recently had psych meds change and has not been acting herself. PHx- Bipolar, HLD. Patient denies S/I, H/I, A/H, V/H. Patient calm and cooperative at this time. Breathing non-labored.

## 2025-07-02 NOTE — ED ADULT NURSE REASSESSMENT NOTE - NS ED NURSE REASSESS COMMENT FT1
Patient awake and resting in stretcher; respirations even and unlabored, no signs/symptoms of acute distress. Patient denies pain and offers no complaints at this time. IV discontinued, pt assisted with getting dressed, safety measures in place, pt pending  to return to assisted living.
Report received from [facility]. Pt's at baseline mental status. Breathing unlabored in bed. Pt denies pain, SOB, chest pain, dizziness, n/v, chills. Bed on lowest position, call bell within reach. Pt awaiting transportation for discharge. Care ongoing.
Report received from [facility]. Patient A&Ox3, respirations even and unlabored, no signs/symptoms of acute distress. Patient denies dyspnea, shortness of breath, and chest pain. Patient denies pain and offers no complaints at this time. Pt stable and transported to ultrasound, safety measures in place, will provide care as needed.
Return from break, patient resting in stretcher; respirations even and unlabored, no signs/symptoms of acute distress. Non-verbal indicators of pain absent, safety measures in place, will provide care as needed.
Patient awake and resting in stretcher; respirations even and unlabored, no signs/symptoms of acute distress. Labs collected and sent, urine collected and sent, steady gait observed, stable rhythm on telemetry monitor, meal provided, safety measures in place, will provide care as needed.

## 2025-07-02 NOTE — ED ADULT NURSE NOTE - NSFALLRISKINTERV_ED_ALL_ED

## 2025-07-02 NOTE — ED PROVIDER NOTE - PATIENT PORTAL LINK FT
You can access the FollowMyHealth Patient Portal offered by [facility] by registering at the following website: [email or web address]. By joining [employer]’s FollowMyHealth portal, you will also be able to view your health information using other applications (apps) compatible with our system.

## 2025-07-02 NOTE — ED ADULT NURSE NOTE - OBJECTIVE STATEMENT
pt received to 3a presents form [facility] for psych eval, recently has her bipolar medication altered, endorsing lethargy. denies auditory/visual hallucinations, denies suicidal ideation, homicidal ideation, or any other symptoms. a&ox4, ambulatory with assistance, skin intact, respirations even and unlabored. awaiting MD zepeda.

## 2025-07-02 NOTE — ED PROVIDER NOTE - ATTENDING APP SHARED VISIT CONTRIBUTION OF CARE
76 yo F hx bipolar disorder and anemia, sent in from NH for agitation in setting of medication change. Denies SI/HI AH/VH. Pt is calm and cooperative in the ED. Appreciate  alondra collateral. Pt taken off of Trazodone but now placed back on it today and they are able take her back. Pt A&Ox3, no signs of withdrawal-- no tremors, palpitations, vss, no tongue fasciculations.  Plan for labs, CTH, ua, and dc back to NH.     VITALS: reviewed  GEN: NAD, A & O x 4  HEAD/EYES: NCAT, EOMI, anicteric sclerae,   ENT: mucus membranes moist, oropharynx WNL, trachea midline,  RESP: lungs CTA with equal breath sounds bilaterally, chest wall nontender and atraumatic  CV: heart with reg rhythm S1, S2, distal pulses intact and symmetric bilaterally  ABDOMEN: normoactive bowel sounds, soft, nondistended, nontender, no palpable masses  : no CVAT  MSK: extremities atraumatic and nontender, no edema, no asymmetry.  SKIN: warm, dry, no rash, no bruising, no cyanosis. color appropriate for ethnicity  NEURO: alert, mentating appropriately, no facial asymmetry.   PSYCH: Affect appropriate 75 yo F hx bipolar disorder and anemia, sent in from NH for agitation in setting of medication change. Denies SI/HI AH/VH. Pt is calm and cooperative in the ED. Appreciate  alondra collateral. Pt taken off of Trazodone but now placed back on it today and they are able take her back. Pt A&Ox3, no signs of withdrawal-- no tremors, palpitations, vss, no tongue fasciculations.  Plan for labs, CTH, ua, and dc back to NH.     VITALS: reviewed  GEN: NAD, A & O x 4  HEAD/EYES: NCAT, EOMI, anicteric sclerae,   ENT: mucus membranes moist, oropharynx WNL, trachea midline,  RESP: lungs CTA with equal breath sounds bilaterally, chest wall nontender and atraumatic  CV: heart with reg rhythm S1, S2, distal pulses intact and symmetric bilaterally  ABDOMEN: normoactive bowel sounds, soft, nondistended, nontender, no palpable masses  : no CVAT  MSK: extremities atraumatic and nontender, no edema, no asymmetry.  SKIN: warm, dry, no rash, no bruising, no cyanosis. color appropriate for ethnicity  NEURO: alert, mentating appropriately, no facial asymmetry.   PSYCH: Affect appropriate

## 2025-07-02 NOTE — ED PROVIDER NOTE - PROGRESS NOTE DETAILS
discussed with Jojo Sykes director of [facility] reports patient having manic episodes, increased cofusion and paranoia had recent med change since saturday. Attending MD aware and agrees with plan Juan Antonio Barron PA-C Pt reassessed at bedside, feels well, pain controlled. Informed of workup in ED, reviewed lab and/or radiology results (when applicable) with patient/caregiver. Informed pt of plan for discharge with instructions to follow up with primary medical doctor. SW contacted will arrange transportaion back. SW discussed with facility will restart meds. Pt/caregiver expressed understanding of plan and agrees with plan for discharge. Strict return precautions discussed with patient in layman's terms, patient demonstrated understanding of return precautions. Juan Antonio Barron PA-C

## 2025-07-02 NOTE — ED PROVIDER NOTE - NSFOLLOWUPINSTRUCTIONS_ED_ALL_ED_FT
There are no signs of emergency conditions requiring admission to the hospital on today's workup.  Based on the evaluation, a presumptive diagnosis was made, however, further evaluation may be required by your primary care physician or a specialist for a more definitive diagnosis.  Therefore, please follow-up as directed or return to the Emergency Department if your symptoms change or worsen.    We recommend that you:  1. See your primary care physician within the next 72 hours for follow up.  Bring a copy of your discharge paperwork (including any test results) to your doctor.  2. Follow up with your psychiatric team at your facility.      *** Return immediately if you have worsening symptoms, chest pain, Shortness of Breath, abdominal pain, Nausea/Vomiting/Diarrhea, dizziness, weakness, confusion, severe headache, vision changes, urinary symptoms, syncope, falls, trauma, discharge, bleeding, fevers, or any other new/concerning symptoms. ***

## 2025-07-02 NOTE — ED PROVIDER NOTE - OBJECTIVE STATEMENT
- 75-year-old female with a past medical history of bipolar disorder and anemia presents to the emergency department for symptoms related to medication withdrawal. The patient had recently attempted to discontinue clonazepam and trazodone 10mg, which resulted in loss of bowel and bladder control, dizziness, and a feeling of severe illness. She was previously evaluated at the hospital and discharged, but experienced recurrent symptoms this morning before taking her medication. Shortly after stopping these medications, she experienced symptoms including feeling general malaise, dizziness, increased frequency of BM. She came to the ED yesterday with similar symptoms and had work up and was DCed. This morning, before taking her medication and without eating, she experienced similar symptoms, though less severe. After taking her medication, her symptoms resolved within a few minutes. The wellness staff at her Assisted Living facility called an ambulance due to her symptoms, but by the time she arrived at the ED, her symptoms had resolved. She denies any current chest pain, shortness of breath, diarrhea, blood in stool, or vomiting. The patient reports no suicidal or homicidal ideation and states she is 'very happy'. - 74-year-old female with a past medical history of bipolar disorder and anemia presents to the emergency department for symptoms related to medication withdrawal. The patient had recently attempted to discontinue clonazepam and trazodone 10mg, which resulted in loss of bowel and bladder control, dizziness, and a feeling of severe illness. She was previously evaluated at the hospital and discharged, but experienced recurrent symptoms this morning before taking her medication. Shortly after stopping these medications, she experienced symptoms including feeling general malaise, dizziness, increased frequency of BM. She came to the ED yesterday with similar symptoms and had work up and was DCed. This morning, before taking her medication and without eating, she experienced similar symptoms, though less severe. After taking her medication, her symptoms resolved within a few minutes. The wellness staff at her Assisted Living facility called an ambulance due to her symptoms, but by the time she arrived at the ED, her symptoms had resolved. She denies any current chest pain, shortness of breath, diarrhea, blood in stool, or vomiting. The patient reports no suicidal or homicidal ideation and states she is 'very happy'.

## 2025-07-03 LAB — T PALLIDUM AB TITR SER: NEGATIVE — SIGNIFICANT CHANGE UP
